# Patient Record
Sex: MALE | Race: OTHER | HISPANIC OR LATINO
[De-identification: names, ages, dates, MRNs, and addresses within clinical notes are randomized per-mention and may not be internally consistent; named-entity substitution may affect disease eponyms.]

---

## 2024-07-25 ENCOUNTER — TRANSCRIPTION ENCOUNTER (OUTPATIENT)
Age: 34
End: 2024-07-25

## 2024-07-26 ENCOUNTER — TRANSCRIPTION ENCOUNTER (OUTPATIENT)
Age: 34
End: 2024-07-26

## 2024-07-26 ENCOUNTER — RESULT REVIEW (OUTPATIENT)
Age: 34
End: 2024-07-26

## 2024-07-30 PROBLEM — Z00.00 ENCOUNTER FOR PREVENTIVE HEALTH EXAMINATION: Status: ACTIVE | Noted: 2024-07-30

## 2024-08-09 ENCOUNTER — APPOINTMENT (OUTPATIENT)
Dept: SURGERY | Facility: CLINIC | Age: 34
End: 2024-08-09

## 2024-08-09 PROBLEM — F17.200 CURRENT EVERY DAY SMOKER: Status: ACTIVE | Noted: 2024-08-09

## 2024-08-09 PROBLEM — Z82.49 FAMILY HISTORY OF HYPERTENSION: Status: ACTIVE | Noted: 2024-08-09

## 2024-08-09 PROBLEM — J45.909 MILD ASTHMA, UNSPECIFIED WHETHER COMPLICATED, UNSPECIFIED WHETHER PERSISTENT: Status: ACTIVE | Noted: 2024-08-09

## 2024-08-09 PROBLEM — Z78.9 DOES NOT USE ILLICIT DRUGS: Status: ACTIVE | Noted: 2024-08-09

## 2024-08-09 PROBLEM — Z78.9 CONSUMES ALCOHOL OCCASIONALLY: Status: ACTIVE | Noted: 2024-08-09

## 2024-08-09 PROCEDURE — 99024 POSTOP FOLLOW-UP VISIT: CPT

## 2024-08-09 NOTE — HISTORY OF PRESENT ILLNESS
[de-identified] : 33M s/p lap appendectomy presenting for postop visit. He's been well, no pain, eating well having regular GI function  AVSS Gen: NAD Resp: not labored GI: soft, NT, ND, no guarding, incisions well healed  33M s/p lap appendectomy - discussed pathology: acute appendicitis - no further follow ups

## 2024-08-09 NOTE — HISTORY OF PRESENT ILLNESS
[de-identified] : 33M s/p lap appendectomy presenting for postop visit. He's been well, no pain, eating well having regular GI function  AVSS Gen: NAD Resp: not labored GI: soft, NT, ND, no guarding, incisions well healed  33M s/p lap appendectomy - discussed pathology: acute appendicitis - no further follow ups